# Patient Record
Sex: FEMALE | Race: WHITE | NOT HISPANIC OR LATINO
[De-identification: names, ages, dates, MRNs, and addresses within clinical notes are randomized per-mention and may not be internally consistent; named-entity substitution may affect disease eponyms.]

---

## 2021-11-24 ENCOUNTER — TRANSCRIPTION ENCOUNTER (OUTPATIENT)
Age: 21
End: 2021-11-24

## 2022-01-06 ENCOUNTER — EMERGENCY (EMERGENCY)
Facility: HOSPITAL | Age: 22
LOS: 1 days | Discharge: HOME | End: 2022-01-06
Attending: EMERGENCY MEDICINE
Payer: COMMERCIAL

## 2022-01-06 VITALS
RESPIRATION RATE: 18 BRPM | DIASTOLIC BLOOD PRESSURE: 86 MMHG | SYSTOLIC BLOOD PRESSURE: 135 MMHG | OXYGEN SATURATION: 100 % | HEIGHT: 67 IN | HEART RATE: 90 BPM | WEIGHT: 128.09 LBS | TEMPERATURE: 98 F

## 2022-01-06 LAB — S PYO AG SPEC QL IA: NEGATIVE — SIGNIFICANT CHANGE UP

## 2022-01-06 PROCEDURE — 99285 EMERGENCY DEPT VISIT HI MDM: CPT

## 2022-01-06 RX ORDER — ACETAMINOPHEN 500 MG
975 TABLET ORAL ONCE
Refills: 0 | Status: COMPLETED | OUTPATIENT
Start: 2022-01-06 | End: 2022-01-06

## 2022-01-06 RX ORDER — CIPROFLOXACIN HCL 0.3 %
10 DROPS OPHTHALMIC (EYE) ONCE
Refills: 0 | Status: COMPLETED | OUTPATIENT
Start: 2022-01-06 | End: 2022-01-06

## 2022-01-06 RX ORDER — DEXAMETHASONE 0.5 MG/5ML
6 ELIXIR ORAL ONCE
Refills: 0 | Status: COMPLETED | OUTPATIENT
Start: 2022-01-06 | End: 2022-01-06

## 2022-01-06 RX ADMIN — Medication 975 MILLIGRAM(S): at 23:04

## 2022-01-06 RX ADMIN — Medication 10 DROP(S): at 23:55

## 2022-01-06 RX ADMIN — Medication 6 MILLIGRAM(S): at 23:04

## 2022-01-06 NOTE — ED PROVIDER NOTE - PATIENT PORTAL LINK FT
You can access the FollowMyHealth Patient Portal offered by [facility] by registering at the following website: [email or web address]. By joining [employer]’s FollowMyHealth portal, you will also be able to view your health information using other applications (apps) compatible with our system.

## 2022-01-06 NOTE — ED PROVIDER NOTE - OBJECTIVE STATEMENT
22 yo female presents to the ER for evaluation of right ear and throat pain since yesterday.  Pt awake, alert oriented x3 in BLUE 33R, states "I started to not feel well yesterday with a sore throat and difficulty swallowing and then my right ear began to hurt.  Today I started to use over the counter ear drops and taking motrin  600 mg every 3 hrs today because the pain in my right ear is so severe". Pt tolerating secretions, no cough. Denies chest pains and shortness of breath.   Pt   denies fevers and chills or sick contacts.  PT  fully vaccinated for COVID.

## 2022-01-06 NOTE — ED PROVIDER NOTE - ATTENDING CONTRIBUTION TO CARE
Attending Statement (VAIBHAV Henriquez MD):    HPI: 21F with no reported medical history, c/o R ear pain x 3 days; mild throat discomfort, no difficulty swallowing; no fever, no cough/congestion.    Review of Systems:  -General: no fever  -ENT: no congestion, no difficulty swallowing; see hpi  -Pulmonary: no cough, no shortness of breath  -Cardiac: no chest pain  -Gastrointestinal: no abdominal pain, no nausea, no vomiting, and no diarrhea.  -Musculoskeletal: no back or neck pain  -Skin: no rashes  -Endocrine: No h/o diabetes     On Physical Exam:  General: well appearing, in NAD, speaking clearly in full sentences and without difficulty; cooperative with exam  HEENT: PERRL, MMM, mild posterior pharyngeal erythema without tonsillar enlargement/deviation, no uvula enlargement/deviation, no exudates or vesicles; no tongue elevation; R TM clear; view partially obstructed by cerumen; mildly erythematous and indurated R ext auditory canal.  Neck: no neck tenderness, no nuchal rigidity  Cardiac: s1s2  Lungs: CTABL  Skin: intact, no rash  Extremities: no peripheral edema, no gross deformities    MDM: PE consistent with otitis externa; will treat with topical antibiotics; improving now after medications/decadron; mild pharyngitis without fever; most likely this is virally mediated; check strep rapid test and send covid swab; if RSA + will treat with antibiotics, otherwise can await culture results.  Stable for dc with outpatient f/u with pcp.  Airway clear/patent, no signs/symptoms suggestive of ludwigs, PTA or RPA.

## 2022-01-06 NOTE — ED PROVIDER NOTE - NSFOLLOWUPINSTRUCTIONS_ED_ALL_ED_FT
1. Follow up with your primary care physician within 2-3days for reevaluation.  2.  Return to the Emergency Department for worsening, progressive or any other concerning symptoms.   3. REst, stay well hydrated and drink plenty of water  4.  -- Please use 650mg Tylenol (also called acetaminophen) every 4 hours & 600mg Motrin (also called Advil or ibuprofen) every 6 hours as needed for pain/discomfort/swelling. You can get these without a prescription. Don't use more than 3500mg of Tylenol in any 24-hour period. Make sure your other prescription/over-the-counter medications don't contain any Tylenol so you don't take too much. If you have any stomach discomfort while taking Motrin, you can use TUMS or Pepcid or Zantac (these can all be bought without a prescription).   5. Use  cirpo ear drops to right ear  - 10 drops twice daily  for 14 days  6.  Follow up with your  primary care provider in the next week

## 2022-01-06 NOTE — ED PROVIDER NOTE - CLINICAL SUMMARY MEDICAL DECISION MAKING FREE TEXT BOX
two days of throat pain and right ear pain- taking motrin and otc ear gtts-    likely otitis- cipro, analgesia,  covid swab, throat cul with rapid strep

## 2022-01-07 VITALS
SYSTOLIC BLOOD PRESSURE: 131 MMHG | TEMPERATURE: 98 F | OXYGEN SATURATION: 100 % | HEART RATE: 104 BPM | RESPIRATION RATE: 18 BRPM | DIASTOLIC BLOOD PRESSURE: 89 MMHG

## 2022-01-07 LAB — SARS-COV-2 RNA SPEC QL NAA+PROBE: SIGNIFICANT CHANGE UP

## 2022-01-07 PROCEDURE — 87880 STREP A ASSAY W/OPTIC: CPT

## 2022-01-07 PROCEDURE — U0005: CPT

## 2022-01-07 PROCEDURE — 87081 CULTURE SCREEN ONLY: CPT

## 2022-01-07 PROCEDURE — 99283 EMERGENCY DEPT VISIT LOW MDM: CPT

## 2022-01-07 PROCEDURE — U0003: CPT

## 2022-01-07 NOTE — ED ADULT NURSE NOTE - OBJECTIVE STATEMENT
22 y/o F A&Ox3 denies PMH/PSH presents to the ED c/o ear pain. Pt reports right ear and throat pain x3 days. Pt states she has been taking OTC pain medication w/ limited relief. Upon arrival to Ed pt is well appearing. Breathing is even and unlabored. Skin is warm, dry & in tact. Denies fevers, chills, CP, SOB,  HA, numbness, tingling, urinary s/s. Comfort & safety provided.

## 2022-01-07 NOTE — ED ADULT NURSE NOTE - NSIMPLEMENTINTERV_GEN_ALL_ED
Implemented All Universal Safety Interventions:  [place] to call system. Call bell, personal items and telephone within reach. Instruct patient to call for assistance. Room bathroom lighting operational. Non-slip footwear when patient is off stretcher. Physically safe environment: no spills, clutter or unnecessary equipment. Stretcher in lowest position, wheels locked, appropriate side rails in place.

## 2022-01-08 ENCOUNTER — EMERGENCY (EMERGENCY)
Facility: HOSPITAL | Age: 22
LOS: 1 days | Discharge: HOME | End: 2022-01-08
Attending: EMERGENCY MEDICINE
Payer: COMMERCIAL

## 2022-01-08 VITALS
HEART RATE: 81 BPM | SYSTOLIC BLOOD PRESSURE: 106 MMHG | OXYGEN SATURATION: 98 % | DIASTOLIC BLOOD PRESSURE: 70 MMHG | RESPIRATION RATE: 20 BRPM | TEMPERATURE: 99 F

## 2022-01-08 VITALS
RESPIRATION RATE: 20 BRPM | HEIGHT: 67 IN | HEART RATE: 95 BPM | DIASTOLIC BLOOD PRESSURE: 89 MMHG | OXYGEN SATURATION: 98 % | TEMPERATURE: 98 F | SYSTOLIC BLOOD PRESSURE: 132 MMHG | WEIGHT: 125 LBS

## 2022-01-08 DIAGNOSIS — J03.90 ACUTE TONSILLITIS, UNSPECIFIED: ICD-10-CM

## 2022-01-08 LAB
ALBUMIN SERPL ELPH-MCNC: 4.3 G/DL — SIGNIFICANT CHANGE UP (ref 3.3–5)
ALP SERPL-CCNC: 57 U/L — SIGNIFICANT CHANGE UP (ref 40–120)
ALT FLD-CCNC: 16 U/L — SIGNIFICANT CHANGE UP (ref 10–45)
ANION GAP SERPL CALC-SCNC: 13 MMOL/L — SIGNIFICANT CHANGE UP (ref 5–17)
AST SERPL-CCNC: 22 U/L — SIGNIFICANT CHANGE UP (ref 10–40)
BASOPHILS # BLD AUTO: 0.06 K/UL — SIGNIFICANT CHANGE UP (ref 0–0.2)
BASOPHILS NFR BLD AUTO: 0.4 % — SIGNIFICANT CHANGE UP (ref 0–2)
BILIRUB SERPL-MCNC: 0.7 MG/DL — SIGNIFICANT CHANGE UP (ref 0.2–1.2)
BUN SERPL-MCNC: 7 MG/DL — SIGNIFICANT CHANGE UP (ref 7–23)
CALCIUM SERPL-MCNC: 9.3 MG/DL — SIGNIFICANT CHANGE UP (ref 8.4–10.5)
CHLORIDE SERPL-SCNC: 106 MMOL/L — SIGNIFICANT CHANGE UP (ref 96–108)
CO2 SERPL-SCNC: 22 MMOL/L — SIGNIFICANT CHANGE UP (ref 22–31)
CREAT SERPL-MCNC: 0.64 MG/DL — SIGNIFICANT CHANGE UP (ref 0.5–1.3)
EOSINOPHIL # BLD AUTO: 0.13 K/UL — SIGNIFICANT CHANGE UP (ref 0–0.5)
EOSINOPHIL NFR BLD AUTO: 1 % — SIGNIFICANT CHANGE UP (ref 0–6)
GLUCOSE SERPL-MCNC: 102 MG/DL — HIGH (ref 70–99)
HCG SERPL-ACNC: <2 MIU/ML — SIGNIFICANT CHANGE UP
HCT VFR BLD CALC: 36.5 % — SIGNIFICANT CHANGE UP (ref 34.5–45)
HGB BLD-MCNC: 12.5 G/DL — SIGNIFICANT CHANGE UP (ref 11.5–15.5)
IMM GRANULOCYTES NFR BLD AUTO: 0.6 % — SIGNIFICANT CHANGE UP (ref 0–1.5)
LYMPHOCYTES # BLD AUTO: 19.4 % — SIGNIFICANT CHANGE UP (ref 13–44)
LYMPHOCYTES # BLD AUTO: 2.59 K/UL — SIGNIFICANT CHANGE UP (ref 1–3.3)
MCHC RBC-ENTMCNC: 31.3 PG — SIGNIFICANT CHANGE UP (ref 27–34)
MCHC RBC-ENTMCNC: 34.2 GM/DL — SIGNIFICANT CHANGE UP (ref 32–36)
MCV RBC AUTO: 91.5 FL — SIGNIFICANT CHANGE UP (ref 80–100)
MONOCYTES # BLD AUTO: 1.46 K/UL — HIGH (ref 0–0.9)
MONOCYTES NFR BLD AUTO: 10.9 % — SIGNIFICANT CHANGE UP (ref 2–14)
NEUTROPHILS # BLD AUTO: 9.03 K/UL — HIGH (ref 1.8–7.4)
NEUTROPHILS NFR BLD AUTO: 67.7 % — SIGNIFICANT CHANGE UP (ref 43–77)
NRBC # BLD: 0 /100 WBCS — SIGNIFICANT CHANGE UP (ref 0–0)
PLATELET # BLD AUTO: 255 K/UL — SIGNIFICANT CHANGE UP (ref 150–400)
POTASSIUM SERPL-MCNC: 3.6 MMOL/L — SIGNIFICANT CHANGE UP (ref 3.5–5.3)
POTASSIUM SERPL-SCNC: 3.6 MMOL/L — SIGNIFICANT CHANGE UP (ref 3.5–5.3)
PROT SERPL-MCNC: 7.3 G/DL — SIGNIFICANT CHANGE UP (ref 6–8.3)
RBC # BLD: 3.99 M/UL — SIGNIFICANT CHANGE UP (ref 3.8–5.2)
RBC # FLD: 11.9 % — SIGNIFICANT CHANGE UP (ref 10.3–14.5)
SODIUM SERPL-SCNC: 141 MMOL/L — SIGNIFICANT CHANGE UP (ref 135–145)
WBC # BLD: 13.35 K/UL — HIGH (ref 3.8–10.5)
WBC # FLD AUTO: 13.35 K/UL — HIGH (ref 3.8–10.5)

## 2022-01-08 PROCEDURE — 99285 EMERGENCY DEPT VISIT HI MDM: CPT

## 2022-01-08 PROCEDURE — 70491 CT SOFT TISSUE NECK W/DYE: CPT | Mod: MA

## 2022-01-08 PROCEDURE — 84702 CHORIONIC GONADOTROPIN TEST: CPT

## 2022-01-08 PROCEDURE — 85025 COMPLETE CBC W/AUTO DIFF WBC: CPT

## 2022-01-08 PROCEDURE — 96375 TX/PRO/DX INJ NEW DRUG ADDON: CPT | Mod: XU

## 2022-01-08 PROCEDURE — 80053 COMPREHEN METABOLIC PANEL: CPT

## 2022-01-08 PROCEDURE — 96374 THER/PROPH/DIAG INJ IV PUSH: CPT | Mod: XU

## 2022-01-08 PROCEDURE — 70491 CT SOFT TISSUE NECK W/DYE: CPT | Mod: 26,MA

## 2022-01-08 PROCEDURE — 99284 EMERGENCY DEPT VISIT MOD MDM: CPT | Mod: 25

## 2022-01-08 PROCEDURE — 36415 COLL VENOUS BLD VENIPUNCTURE: CPT

## 2022-01-08 RX ORDER — DEXAMETHASONE 0.5 MG/5ML
10 ELIXIR ORAL ONCE
Refills: 0 | Status: COMPLETED | OUTPATIENT
Start: 2022-01-08 | End: 2022-01-08

## 2022-01-08 RX ORDER — SODIUM CHLORIDE 9 MG/ML
1000 INJECTION INTRAMUSCULAR; INTRAVENOUS; SUBCUTANEOUS ONCE
Refills: 0 | Status: COMPLETED | OUTPATIENT
Start: 2022-01-08 | End: 2022-01-08

## 2022-01-08 RX ORDER — KETOROLAC TROMETHAMINE 30 MG/ML
15 SYRINGE (ML) INJECTION ONCE
Refills: 0 | Status: DISCONTINUED | OUTPATIENT
Start: 2022-01-08 | End: 2022-01-08

## 2022-01-08 RX ORDER — ONDANSETRON 8 MG/1
4 TABLET, FILM COATED ORAL ONCE
Refills: 0 | Status: COMPLETED | OUTPATIENT
Start: 2022-01-08 | End: 2022-01-08

## 2022-01-08 RX ADMIN — Medication 102 MILLIGRAM(S): at 05:08

## 2022-01-08 RX ADMIN — SODIUM CHLORIDE 1000 MILLILITER(S): 9 INJECTION INTRAMUSCULAR; INTRAVENOUS; SUBCUTANEOUS at 05:04

## 2022-01-08 RX ADMIN — Medication 100 MILLIGRAM(S): at 05:08

## 2022-01-08 RX ADMIN — Medication 15 MILLIGRAM(S): at 05:04

## 2022-01-08 RX ADMIN — ONDANSETRON 4 MILLIGRAM(S): 8 TABLET, FILM COATED ORAL at 06:25

## 2022-01-08 NOTE — CONSULT NOTE ADULT - PROBLEM SELECTOR RECOMMENDATION 9
-Received one dose of clinda and deca in ED  -Cont w abx and steroids  -Pt may go home when tolerating PO

## 2022-01-08 NOTE — ED PROVIDER NOTE - PHYSICAL EXAMINATION
gen: appears uncomfortable  Mentation: AAO x 3  psych: mood appropriate  Head: no mastoid tenderness  ENT: airway patent, mild trismus, swelling of R peritonsillar region with L ashton uvula deviation  Eyes: conjunctivae clear bilaterally  Cardio: RRR, no m/r/g  Resp: normal BS b/l  GI: s/nt/nd  Neuro: sensation and motor function intact, CN 2-12 intact  Skin: No evidence of rash  MSK: normal movement of all extremities

## 2022-01-08 NOTE — ED PROVIDER NOTE - OBJECTIVE STATEMENT
20 y/o F with no reported PMH presenting with throat pain and R ear pain. States was evaluated in ED 1 day ago, dx with otitis and DC with cipro drops. States since then throat pain has gotten worse. Has decreased PO intake due to pain with swallowing though able to swallow secretions. Denies fevers, chills, cp, cough, sob, n/v/d, abd pain

## 2022-01-08 NOTE — ED PROVIDER NOTE - CLINICAL SUMMARY MEDICAL DECISION MAKING FREE TEXT BOX
DO Shirin PGY-3: 20 y/o F presenting with significant swelling R tonsillar region with uvular deviation concern for PTA. Labs, abx, dex, fluids. CT to eval for deep space infection

## 2022-01-08 NOTE — CONSULT NOTE ADULT - ASSESSMENT
20 yo F w throat and ear pain. Exam significant for tonsillar enlargement R>L. No ear infection on exam, likely referred pain. CT confirming no abscess.

## 2022-01-08 NOTE — ED ADULT NURSE NOTE - OBJECTIVE STATEMENT
patient is a 20 y/o F with no pertinent PMH who presents to the ED c/o right ear pain and throat pain that has worsened since yesterday. patient seen and d/c from [facility] ED yesterday for similar symptoms with cipro ear drops. patient states that since last night the pain has worsened which has caused decreased po intake d/t pain with swallowing. patient is in NAD resting comfortably and maintaining oral secretions. SPO2 >96% on RA with symmetrical chest rise and unlabored respirations. MD Whittington at bedside to assess. patient updated on plan of care. comfort and safety maintained

## 2022-01-08 NOTE — ED PROVIDER NOTE - PROGRESS NOTE DETAILS
DO Shirin PGY-3: patient feels symptoms improved after meds, tolerating PO. will DC with abx course and ENT f/u

## 2022-01-08 NOTE — ED PROVIDER NOTE - ATTENDING CONTRIBUTION TO CARE
Pt with persistent R sided throat pain radiating to R ear, hurts to eat/drink.  Pt appears well, nontoxic, swollen L adenoid/tonsil area.  Will ct r/o PTA, labs, pain meds, ENT consult.

## 2022-01-08 NOTE — ED PROVIDER NOTE - PATIENT PORTAL LINK FT
You can access the FollowMyHealth Patient Portal offered by [facility] by registering at the following website: [email or web address]. By joining [employer]’s FollowMyHealth portal, you will also be able to view your health information using other applications (apps) compatible with our system.

## 2022-01-08 NOTE — ED PROVIDER NOTE - NSFOLLOWUPCLINICS_GEN_ALL_ED_FT
[facility] - ENT  Otolaryngology (ENT)  [address] [place] [address]  Phone: [phone]  Fax:

## 2022-01-08 NOTE — CONSULT NOTE ADULT - SUBJECTIVE AND OBJECTIVE BOX
CC: throat pain    HPI: 20 y/o F with no reported PMH presenting with throat pain and R ear pain. States was evaluated in ED 1 day ago, dx with otitis and DC with cipro drops. States since then throat pain has gotten worse. Has decreased PO intake due to pain with swallowing though able to swallow secretions. Denies fevers, chills, cp, cough, sob, n/v/d, abd pain  ENT called for evaluation. Pt received one dose of clinda and decadron in ED. CT below      PAST MEDICAL & SURGICAL HISTORY:    Allergies    Cefzil (Unknown)    Intolerances      MEDICATIONS  (STANDING):    MEDICATIONS  (PRN):      Social History: No reported toxic habits    Family history: No significant medical history in first degree relatives      ROS:   ENT: all negative except as noted in HPI   Skin: No itching, dryness, rash, changes to hair, or skin masses  CV: denies palpitations  Pulm: denies SOB, cough, hemoptysis  GI: denies change in appetite, indigestion, n/v  : denies pertinent urinary symptoms, urgency  Neuro: denies numbness/tingling, loss of sensation  Psych: denies anxiety  MS: denies muscle weakness, instability  Heme: denies easy bruising or bleeding  Endo: denies heat/cold intolerance, excessive sweating  Vascular: denies LE edema    Vital Signs Last 24 Hrs  T(C): 36.8 (08 Jan 2022 04:21), Max: 36.8 (08 Jan 2022 04:21)  T(F): 98.3 (08 Jan 2022 04:21), Max: 98.3 (08 Jan 2022 04:21)  HR: 77 (08 Jan 2022 05:10) (77 - 95)  BP: 92/65 (08 Jan 2022 05:10) (92/65 - 132/89)  BP(mean): 73 (08 Jan 2022 05:10) (73 - 73)  RR: 22 (08 Jan 2022 05:10) (20 - 22)  SpO2: 97% (08 Jan 2022 05:10) (97% - 98%)                          12.5   13.35 )-----------( 255      ( 08 Jan 2022 05:13 )             36.5    01-08    141  |  106  |  7   ----------------------------<  102<H>  3.6   |  22  |  0.64    Ca    9.3      08 Jan 2022 05:13    TPro  7.3  /  Alb  4.3  /  TBili  0.7  /  DBili  x   /  AST  22  /  ALT  16  /  AlkPhos  57  01-08       PHYSICAL EXAM:  Gen: NAD  Skin: No rashes, bruises, or lesions  Head: Normocephalic, Atraumatic  Face: no edema, erythema, or fluctuance. Parotid glands soft without mass  Eyes: no scleral injection  Ears: Right - ear canal with some cerumen, no swelling or erythema, TM intact without effusion or erythema. No evidence of any fluid drainage. No mastoid tenderness, erythema, or ear bulging            Left - ear canal clear, TM intact without effusion or erythema. No evidence of any fluid drainage. No mastoid tenderness, erythema, or ear bulging  Nose: Nares bilaterally patent, no discharge  Mouth: No Stridor / Drooling / Trismus.  Mucosa moist, tongue/uvula midline, tonsils enlarged b/l R>L  Neck: Flat, supple, trachea midline, no masses  Lymphatic: mild reactive LAD  Resp: breathing easily, no stridor  CV: no peripheral edema/cyanosis  GI: nondistended   Peripheral vascular: no JVD or edema  Neuro: facial nerve intact, no facial droop            IMAGING/ADDITIONAL STUDIES:   < from: CT Neck Soft Tissue w/ IV Cont (01.08.22 @ 05:42) >  ACC: [id number] EXAM:  CT NECK SOFT TISSUE IC                          PROCEDURE DATE:  01/08/2022          INTERPRETATION:  INDICATIONS:  Throat pain and right ear pain.    TECHNIQUE:   Axial images were obtained following the intravenous   administration of contrast material. Sagittal and coronal reformatted   images were obtained. 90 cc Omnipaque 350 were administered. 10 cc were   discarded.    COMPARISON EXAMINATION:  None.    FINDINGS:  AERODIGESTIVE TRACT:  There is edema of the right palatine tonsils. No   discrete fluid collection is seen.  The epiglottis and aryepiglottic   folds are not thickened.  LYMPH NODES:  Nonspecific subcentimeter lymph nodes that do not meet size   criteria for adenopathy.  PAROTID GLANDS:  Normal.  SUBMANDIBULAR GLANDS:  Normal.  THYROID GLAND:  Normal.  VISUALIZED PARANASAL SINUSES:  Small retention polyp or cyst in the left   maxillary sinus.  VISUALIZED TYMPANOMASTOID CAVITIES: Clear.  Middle ear structure are   unremarkable.  BONES:  Normal.  MISCELLANEOUS:  Vascular structures are unremarkable.    IMPRESSION:  Tonsillitis of the right palatine tonsils. No discrete abscess is seen.    --- End of Report ---    < end of copied text >

## 2022-01-08 NOTE — ED PROVIDER NOTE - NSFOLLOWUPINSTRUCTIONS_ED_ALL_ED_FT
Tonsillitis    WHAT YOU NEED TO KNOW:    Tonsillitis is inflammation of your tonsils. Tonsils are the lumps of tissue on both sides of the back of your throat. Tonsils are part of your immune system. They help you fight infections. Recurrent tonsillitis is when you have tonsillitis many times in 1 year. Chronic tonsillitis is when you have a sore throat that lasts 3 months or longer.       DISCHARGE INSTRUCTIONS:    Medicines: You may need any of the following:   •Acetaminophen decreases pain and fever. It is available without a doctor's order. Ask how much to take and how often to take it. Follow directions. Acetaminophen can cause liver damage if not taken correctly.    •NSAIDs, such as ibuprofen, help decrease swelling, pain, and fever. This medicine is available with or without a doctor's order. NSAIDs can cause stomach bleeding or kidney problems in certain people. If you take blood thinner medicine, always ask your healthcare provider if NSAIDs are safe for you. Always read the medicine label and follow directions.    •Antibiotics help treat a bacterial infection.    •Take your medicine as directed. Contact your healthcare provider if you think your medicine is not helping or if you have side effects. Tell him or her if you are allergic to any medicine. Keep a list of the medicines, vitamins, and herbs you take. Include the amounts, and when and why you take them. Bring the list or the pill bottles to follow-up visits. Carry your medicine list with you in case of an emergency.      Call 911 for the following:   •You have trouble breathing because your tonsils are swollen.        Contact your healthcare provider if:   •You have a fever.    •Your pain gets worse or does not get better after you take pain medicine.    •Your sore throat is not better after you have finished antibiotic treatment.    •You have trouble sleeping and wake up trying to catch your breath.    •You have questions or concerns about your condition or care.      Rest when you feel it is needed. Slowly start to do more each day. Return to your daily activities as directed.     Drink liquids as directed: You may need to drink more liquid than usual to help prevent dehydration. Ask how much liquid to drink each day and which liquids are best for you.     Gargle with warm salt water: This may help decrease throat pain. Mix 1 teaspoon of salt in 8 ounces of warm water. Ask how often you should do this.    Prevent the spread of germs: Wash your hands often. Do not share food or drinks with anyone. You may be able to return to work when you feel better and your fever is gone for at least 24 hours.    Follow up with your ENT within 1 week, their information has been provided for you. Write down your questions so you remember to ask them during your visits.

## 2022-01-09 NOTE — ED POST DISCHARGE NOTE - ADDITIONAL DOCUMENTATION
1/13: pts mother called back s/p receiving letter. Consent obtained from daughter to speak with mother. I informed her of Strep culture +. Pt has followed up with ENT is taking Augmentin. Millicent Waller PA-C

## 2022-01-09 NOTE — ED POST DISCHARGE NOTE - DETAILS
1/9: lvm to call back admin line - Glenda Brunson PA-C 1/10: m to call back admin line - Glenda Brunson PA-C 1/11: left vm for c/b. -Lainey Leo PA-C

## 2022-06-07 NOTE — ED ADULT NURSE NOTE - CAS TRG GEN SKIN CONDITION
Patient notified of results, repeat labs ordered.     She reports she's still very tired, still has decreased appetite, sees PCP for this 6/9    Weight  142# today  Denies BLE, abdominal bloating, denies SOB/EVERETT  No palpitations.        Warm/Dry

## 2024-06-01 ENCOUNTER — NON-APPOINTMENT (OUTPATIENT)
Age: 24
End: 2024-06-01